# Patient Record
Sex: MALE | Race: AMERICAN INDIAN OR ALASKA NATIVE
[De-identification: names, ages, dates, MRNs, and addresses within clinical notes are randomized per-mention and may not be internally consistent; named-entity substitution may affect disease eponyms.]

---

## 2017-05-16 ENCOUNTER — HOSPITAL ENCOUNTER (EMERGENCY)
Dept: HOSPITAL 5 - ED | Age: 42
Discharge: HOME | End: 2017-05-16
Payer: SELF-PAY

## 2017-05-16 VITALS — DIASTOLIC BLOOD PRESSURE: 94 MMHG | SYSTOLIC BLOOD PRESSURE: 151 MMHG

## 2017-05-16 DIAGNOSIS — F17.200: ICD-10-CM

## 2017-05-16 DIAGNOSIS — N39.0: Primary | ICD-10-CM

## 2017-05-16 LAB
BILIRUB UR QL STRIP: (no result)
BLOOD UR QL VISUAL: (no result)
KETONES UR STRIP-MCNC: (no result) MG/DL
LEUKOCYTE ESTERASE UR QL STRIP: (no result)
MUCOUS THREADS #/AREA URNS HPF: (no result) /HPF
NITRITE UR QL STRIP: (no result)
PH UR STRIP: 6 [PH] (ref 5–7)
RBC #/AREA URNS HPF: 21 /HPF (ref 0–6)
UROBILINOGEN UR-MCNC: < 2 MG/DL (ref ?–2)
WBC #/AREA URNS HPF: > 182 /HPF (ref 0–6)

## 2017-05-16 PROCEDURE — 81001 URINALYSIS AUTO W/SCOPE: CPT

## 2017-05-16 PROCEDURE — 87591 N.GONORRHOEAE DNA AMP PROB: CPT

## 2017-05-16 PROCEDURE — 96372 THER/PROPH/DIAG INJ SC/IM: CPT

## 2017-05-16 PROCEDURE — 99283 EMERGENCY DEPT VISIT LOW MDM: CPT
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
## 2017-08-03 ENCOUNTER — HOSPITAL ENCOUNTER (EMERGENCY)
Dept: HOSPITAL 5 - ED | Age: 42
Discharge: HOME | End: 2017-08-03
Payer: COMMERCIAL

## 2017-08-03 VITALS — DIASTOLIC BLOOD PRESSURE: 92 MMHG | SYSTOLIC BLOOD PRESSURE: 144 MMHG

## 2017-08-03 DIAGNOSIS — S00.81XA: Primary | ICD-10-CM

## 2017-08-03 DIAGNOSIS — Z88.8: ICD-10-CM
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
## 2017-08-03 PROCEDURE — 99282 EMERGENCY DEPT VISIT SF MDM: CPT

## 2017-08-03 PROCEDURE — 90715 TDAP VACCINE 7 YRS/> IM: CPT

## 2017-08-03 PROCEDURE — 90471 IMMUNIZATION ADMIN: CPT
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
## 2018-07-11 ENCOUNTER — HOSPITAL ENCOUNTER (EMERGENCY)
Dept: HOSPITAL 5 - ED | Age: 43
LOS: 1 days | Discharge: HOME | End: 2018-07-12
Payer: COMMERCIAL

## 2018-07-11 VITALS — DIASTOLIC BLOOD PRESSURE: 77 MMHG | SYSTOLIC BLOOD PRESSURE: 139 MMHG

## 2018-07-11 DIAGNOSIS — Z88.8: ICD-10-CM

## 2018-07-11 DIAGNOSIS — M54.5: Primary | ICD-10-CM

## 2018-07-11 DIAGNOSIS — N30.00: ICD-10-CM

## 2018-07-11 PROCEDURE — 96372 THER/PROPH/DIAG INJ SC/IM: CPT

## 2018-07-11 PROCEDURE — 99284 EMERGENCY DEPT VISIT MOD MDM: CPT

## 2018-07-11 PROCEDURE — 81001 URINALYSIS AUTO W/SCOPE: CPT

## 2018-07-11 PROCEDURE — 72100 X-RAY EXAM L-S SPINE 2/3 VWS: CPT

## 2018-07-12 LAB
BILIRUB UR QL STRIP: (no result)
BLOOD UR QL VISUAL: (no result)
HYALINE CASTS #/AREA URNS LPF: 16 /LPF
MUCOUS THREADS #/AREA URNS HPF: (no result) /HPF
PH UR STRIP: 5 [PH] (ref 5–7)
RBC #/AREA URNS HPF: 3 /HPF (ref 0–6)
UROBILINOGEN UR-MCNC: 2 MG/DL (ref ?–2)
WBC #/AREA URNS HPF: 15 /HPF (ref 0–6)

## 2018-07-12 NOTE — XRAY REPORT
FINAL REPORT



EXAM:  XR SPINE LUMBOSACRAL 2-3V



HISTORY:  back pain that is not responding to meds 



COMPARISON:  None available. 



FINDINGS:  

Three views of the lumbar spine obtained. Lumbar vertebral body

heights are preserved. Mild loss of disc height endplate

osteophyte L5-S1 level. Mild loss of disc height endplate

osteophyte T12-L1 level. Remaining disc heights are preserved.

Pedicles are intact. No spondylolisthesis. 



IMPRESSION:  

Mild focal degenerative changes at the T12-L1 and L5-S1 levels.

## 2018-07-12 NOTE — EMERGENCY DEPARTMENT REPORT
ED Back Pain/Injury HPI





- General


Chief Complaint: Back Pain/Injury


Stated Complaint: BACK PAIN


Time Seen by Provider: 07/12/18 00:27


Source: patient


Limitations: No Limitations





- History of Present Illness


Initial Comments: 





42-year-old -American male comes in complaining of right sided lower 

back pain onset yesterday.  Patient denies any trauma or falls.  He does report 

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  He reports that he made 

an attempt to go to work yesterday and started having lower back pain to the 

right he took an Aleve to take Tylenol and ibuprofen with no relief.  Patient 

was given Toradol and triage and reports no relief of his pain.  Patient denies 

any urinary or bowel incontinence or issues.


MD Complaint: back pain


-: days(s) (2)


Similar Symptoms Previously: No


Place: work


Radiation: none


Severity scale (0 -10): 8


Quality: sharp


Consistency: constant


Improves With: none


Worsens With: movement


Associated Symptoms: denies other symptoms


Treatments Prior to Arrival: NSAIDS, acetaminophen





- Related Data


 Previous Rx's











 Medication  Instructions  Recorded  Last Taken  Type


 


Ciprofloxacin HCl [Ciprofloxacin 500 mg PO Q12HR #20 tab 05/16/17 Unknown Rx





TAB]    


 


Amoxicillin/K Clav Tab [Augmentin 1 tab PO Q12HR 10 Days  tab 08/03/17 Unknown 

Rx





875 mg]    


 


Cyclobenzaprine [Flexeril] 10 mg PO TID PRN #15 tablet 07/12/18 Unknown Rx


 


Ibuprofen [Motrin 800 MG tab] 800 mg PO Q8HR PRN #30 tablet 07/12/18 Unknown Rx


 


Nitrofurantoin Monohyd/M-Cryst 100 mg PO BID 7 Days #14 capsule 07/12/18 

Unknown Rx





[Macrobid 100 mg Capsule]    











 Allergies











Allergy/AdvReac Type Severity Reaction Status Date / Time


 


promethazine HCl Allergy  Swelling Verified 05/16/17 16:06





[From Phenergan]     














ED Review of Systems


ROS: 


Stated complaint: BACK PAIN


Other details as noted in HPI





Gastrointestinal: denies: abdominal pain, nausea, diarrhea


Genitourinary: denies: urgency, dysuria, frequency, hematuria, discharge


Musculoskeletal: back pain


Skin: denies: rash, lesions





ED Past Medical Hx





- Past Medical History


Previous Medical History?: Yes


Additional medical history: Bronchitis





- Surgical History


Past Surgical History?: No





- Social History


Smoking Status: Light Tobacco Smoker


Substance Use Type: Alcohol





- Medications


Home Medications: 


 Home Medications











 Medication  Instructions  Recorded  Confirmed  Last Taken  Type


 


Ciprofloxacin HCl [Ciprofloxacin 500 mg PO Q12HR #20 tab 05/16/17  Unknown Rx





TAB]     


 


Amoxicillin/K Clav Tab [Augmentin 1 tab PO Q12HR 10 Days  tab 08/03/17  Unknown 

Rx





875 mg]     


 


Cyclobenzaprine [Flexeril] 10 mg PO TID PRN #15 tablet 07/12/18  Unknown Rx


 


Ibuprofen [Motrin 800 MG tab] 800 mg PO Q8HR PRN #30 tablet 07/12/18  Unknown Rx


 


Nitrofurantoin Monohyd/M-Cryst 100 mg PO BID 7 Days #14 capsule 07/12/18  

Unknown Rx





[Macrobid 100 mg Capsule]     














ED Physical Exam





- General


Limitations: No Limitations


General appearance: alert, in no apparent distress





- Head


Head exam: Present: atraumatic, normocephalic





- ENT


ENT exam: Present: mucous membranes moist





- Respiratory


Respiratory exam: Present: normal lung sounds bilaterally.  Absent: respiratory 

distress





- Cardiovascular


Cardiovascular Exam: Present: regular rate, normal rhythm.  Absent: systolic 

murmur, diastolic murmur, rubs, gallop





- Back Exam


Back exam: Present: CVA tenderness (R)





- Expanded Back Exam


  ** Expanded


Back exam: Positive Straight Leg Raise: Right (cross legged positive)





- Neurological Exam


Neurological exam: Present: alert, oriented X3





ED Course


 Vital Signs











  07/11/18 07/11/18 07/11/18





  19:24 19:40 19:55


 


Temperature 98.4 F 98.4 F 


 


Pulse Rate 79 78 


 


Respiratory 16 16 18





Rate   


 


Blood Pressure 139/77 139/77 


 


O2 Sat by Pulse 94 96 





Oximetry   














ED Medical Decision Making





- Radiology Data


Radiology results: report reviewed, image reviewed





FINAL REPORT 





 EXAM: XR SPINE LUMBOSACRAL 2-3V 





 HISTORY: back pain that is not responding to meds 





 COMPARISON: None available. 





 FINDINGS: 


 Three views of the lumbar spine obtained. Lumbar vertebral body 


 heights are preserved. Mild loss of disc height endplate 


 osteophyte L5-S1 level. Mild loss of disc height endplate 


 osteophyte T12-L1 level. Remaining disc heights are preserved. 


 Pedicles are intact. No spondylolisthesis. 





 IMPRESSION: 


 Mild focal degenerative changes at the T12-L1 and L5-S1 levels. 











 Transcribed By: LMA 


 Dictated By: MELISSA ESQUIVEL MD 


 Electronically Authenticated By: MELISSA ESQUIVEL MD 


 Signed Date/Time: 07/12/18 0047 











 DD/DT: 07/12/18 0047 


 TD/TT: 07/12/18 0047 





- Medical Decision Making





Patient has been evaluated but this provider fast track.


Patient is given Toradol 30 mg IM in triage with no relief.


Flexeril 10 mg Percocet 5/325 2 tablets ordered for pain management.


X-ray of back and urinalysis sent.





Critical care attestation.: 


If time is entered above; I have spent that time in minutes in the direct care 

of this critically ill patient, excluding procedure time.








ED Disposition


Clinical Impression: 


Back pain


Qualifiers:


 Back pain location: low back pain Chronicity: acute Back pain laterality: 

right Sciatica presence: without sciatica Qualified Code(s): M54.5 - Low back 

pain





UTI (urinary tract infection)


Qualifiers:


 Urinary tract infection type: acute cystitis Hematuria presence: without 

hematuria Qualified Code(s): N30.00 - Acute cystitis without hematuria





Disposition: DC-01 TO HOME OR SELFCARE


Is pt being admited?: No


Does the pt Need Aspirin: No


Condition: Stable


Instructions:  Low Back Strain (ED), Back Pain (ED)


Additional Instructions: 


Please take pain medication as prescribed.  Please follow-up with the primary 

care provider if her symptoms persist or gets worse.


Prescriptions: 


Cyclobenzaprine [Flexeril] 10 mg PO TID PRN #15 tablet


 PRN Reason: Muscle Spasm


Ibuprofen [Motrin 800 MG tab] 800 mg PO Q8HR PRN #30 tablet


 PRN Reason: Pain , Severe (7-10)


Nitrofurantoin Monohyd/M-Cryst [Macrobid 100 mg Capsule] 100 mg PO BID 7 Days #

14 capsule


Referrals: 


PRIMARY CARE,MD [Primary Care Provider] - 3-5 Days


[facility] [Provider Group] - 3-5 Days


Forms:  Work/School Release Form(ED), Accompanied Note

## 2020-11-27 ENCOUNTER — HOSPITAL ENCOUNTER (INPATIENT)
Dept: HOSPITAL 5 - ED | Age: 45
LOS: 2 days | Discharge: HOME | DRG: 638 | End: 2020-11-29
Attending: INTERNAL MEDICINE | Admitting: INTERNAL MEDICINE
Payer: SELF-PAY

## 2020-11-27 DIAGNOSIS — E86.0: ICD-10-CM

## 2020-11-27 DIAGNOSIS — E10.10: Primary | ICD-10-CM

## 2020-11-27 DIAGNOSIS — Z83.3: ICD-10-CM

## 2020-11-27 DIAGNOSIS — E87.1: ICD-10-CM

## 2020-11-27 DIAGNOSIS — N17.9: ICD-10-CM

## 2020-11-27 DIAGNOSIS — E87.5: ICD-10-CM

## 2020-11-27 DIAGNOSIS — Z79.4: ICD-10-CM

## 2020-11-27 DIAGNOSIS — Z88.8: ICD-10-CM

## 2020-11-27 PROCEDURE — 96372 THER/PROPH/DIAG INJ SC/IM: CPT

## 2020-11-27 PROCEDURE — 80048 BASIC METABOLIC PNL TOTAL CA: CPT

## 2020-11-27 PROCEDURE — 83036 HEMOGLOBIN GLYCOSYLATED A1C: CPT

## 2020-11-27 PROCEDURE — 82962 GLUCOSE BLOOD TEST: CPT

## 2020-11-27 PROCEDURE — 96374 THER/PROPH/DIAG INJ IV PUSH: CPT

## 2020-11-27 PROCEDURE — 96375 TX/PRO/DX INJ NEW DRUG ADDON: CPT

## 2020-11-27 PROCEDURE — 82947 ASSAY GLUCOSE BLOOD QUANT: CPT

## 2020-11-27 PROCEDURE — 83735 ASSAY OF MAGNESIUM: CPT

## 2020-11-27 PROCEDURE — 93005 ELECTROCARDIOGRAM TRACING: CPT

## 2020-11-27 PROCEDURE — 84100 ASSAY OF PHOSPHORUS: CPT

## 2020-11-27 PROCEDURE — 84443 ASSAY THYROID STIM HORMONE: CPT

## 2020-11-27 PROCEDURE — 80061 LIPID PANEL: CPT

## 2020-11-27 PROCEDURE — 81001 URINALYSIS AUTO W/SCOPE: CPT

## 2020-11-27 PROCEDURE — 82805 BLOOD GASES W/O2 SATURATION: CPT

## 2020-11-27 PROCEDURE — 36415 COLL VENOUS BLD VENIPUNCTURE: CPT

## 2020-11-27 PROCEDURE — 85025 COMPLETE CBC W/AUTO DIFF WBC: CPT

## 2020-11-27 PROCEDURE — 82550 ASSAY OF CK (CPK): CPT

## 2020-11-28 LAB
BASOPHILS # (AUTO): 0 K/MM3 (ref 0–0.1)
BASOPHILS NFR BLD AUTO: 0 % (ref 0–1.8)
BILIRUB UR QL STRIP: (no result)
BLOOD UR QL VISUAL: (no result)
BUN SERPL-MCNC: 19 MG/DL (ref 9–20)
BUN SERPL-MCNC: 23 MG/DL (ref 9–20)
BUN SERPL-MCNC: 25 MG/DL (ref 9–20)
BUN SERPL-MCNC: 26 MG/DL (ref 9–20)
BUN SERPL-MCNC: 28 MG/DL (ref 9–20)
BUN SERPL-MCNC: 31 MG/DL (ref 9–20)
BUN SERPL-MCNC: 32 MG/DL (ref 9–20)
BUN/CREAT SERPL: 15 %
BUN/CREAT SERPL: 16 %
BUN/CREAT SERPL: 16 %
BUN/CREAT SERPL: 17 %
BUN/CREAT SERPL: 18 %
BUN/CREAT SERPL: 18 %
BUN/CREAT SERPL: 19 %
CALCIUM SERPL-MCNC: 10.6 MG/DL (ref 8.4–10.2)
CALCIUM SERPL-MCNC: 10.8 MG/DL (ref 8.4–10.2)
CALCIUM SERPL-MCNC: 11.1 MG/DL (ref 8.4–10.2)
CALCIUM SERPL-MCNC: 11.2 MG/DL (ref 8.4–10.2)
CALCIUM SERPL-MCNC: 11.2 MG/DL (ref 8.4–10.2)
CALCIUM SERPL-MCNC: 11.4 MG/DL (ref 8.4–10.2)
CALCIUM SERPL-MCNC: 9.9 MG/DL (ref 8.4–10.2)
EOSINOPHIL # BLD AUTO: 0 K/MM3 (ref 0–0.4)
EOSINOPHIL NFR BLD AUTO: 0 % (ref 0–4.3)
HCT VFR BLD CALC: 45.1 % (ref 35.5–45.6)
HDLC SERPL-MCNC: 53 MG/DL (ref 40–59)
HEMOLYSIS INDEX: 11
HEMOLYSIS INDEX: 14
HEMOLYSIS INDEX: 18
HEMOLYSIS INDEX: 26
HEMOLYSIS INDEX: 5
HEMOLYSIS INDEX: 59
HEMOLYSIS INDEX: 9
HGB BLD-MCNC: 15.7 GM/DL (ref 11.8–15.2)
LYMPHOCYTES # BLD AUTO: 0.8 K/MM3 (ref 1.2–5.4)
LYMPHOCYTES NFR BLD AUTO: 7 % (ref 13.4–35)
MCHC RBC AUTO-ENTMCNC: 29 % (ref 32–34)
MCV RBC AUTO: 108 FL (ref 84–94)
MONOCYTES # (AUTO): 0.6 K/MM3 (ref 0–0.8)
MONOCYTES % (AUTO): 4.9 % (ref 0–7.3)
MUCOUS THREADS #/AREA URNS HPF: (no result) /HPF
PH UR STRIP: 5 [PH] (ref 5–7)
PLATELET # BLD: 195 K/MM3 (ref 140–440)
PROT UR STRIP-MCNC: (no result) MG/DL
RBC # BLD AUTO: 5.02 M/MM3 (ref 3.65–5.03)
RBC #/AREA URNS HPF: 1 /HPF (ref 0–6)
UROBILINOGEN UR-MCNC: < 2 MG/DL (ref ?–2)
WBC #/AREA URNS HPF: 4 /HPF (ref 0–6)

## 2020-11-28 RX ADMIN — Medication SCH: at 22:51

## 2020-11-28 RX ADMIN — HEPARIN SODIUM SCH UNIT: 5000 INJECTION, SOLUTION INTRAVENOUS; SUBCUTANEOUS at 22:50

## 2020-11-28 RX ADMIN — VALSARTAN SCH MG: 40 TABLET, FILM COATED ORAL at 19:44

## 2020-11-28 RX ADMIN — HEPARIN SODIUM SCH UNIT: 5000 INJECTION, SOLUTION INTRAVENOUS; SUBCUTANEOUS at 06:50

## 2020-11-28 RX ADMIN — INSULIN HUMAN SCH UNIT: 100 INJECTION, SUSPENSION SUBCUTANEOUS at 17:38

## 2020-11-28 RX ADMIN — INSULIN LISPRO SCH: 100 INJECTION, SOLUTION INTRAVENOUS; SUBCUTANEOUS at 22:26

## 2020-11-28 RX ADMIN — Medication SCH ML: at 11:13

## 2020-11-28 RX ADMIN — HEPARIN SODIUM SCH UNIT: 5000 INJECTION, SOLUTION INTRAVENOUS; SUBCUTANEOUS at 16:26

## 2020-11-28 RX ADMIN — VALSARTAN SCH: 40 TABLET, FILM COATED ORAL at 22:26

## 2020-11-28 NOTE — PROGRESS NOTE
Assessment and Plan





- Patient Problems


(1) DKA (diabetic ketoacidoses)


Current Visit: Yes   Status: Acute   


Qualifiers: 


   Diabetes mellitus type: type 1 


Plan to address problem: 


Much  improved


Discontinue IV  Insulin


IV fluids


Started on Novolin 70/30 25 units bid


Educated on Diabetes Mellitus 


Diet exercise  advised


 








(2) Dehydration


Current Visit: Yes   Status: Acute   


Plan to address problem: 


IV fluids for now








(3) PAULINA (acute kidney injury)


Current Visit: Yes   Status: Acute   


Plan to address problem: 


IV fluids for now


Improved








(4) Hyponatremia


Current Visit: Yes   Status: Acute   


Plan to address problem: 


Improved








(5) Hyperkalemia


Current Visit: Yes   Status: Acute   


Plan to address problem: 


Improved








(6) DVT prophylaxis


Current Visit: Yes   Status: Acute   


Plan to address problem: 


Heparin and GI prophylaxis








Subjective


Date of service: 11/28/20


Principal diagnosis: DKA


Interval history: 


45-year-old -American male with no significant past medical problems 

presenting to the emergency room today with complaints of been dehydrated.  He 

states he has been having dry mouth, polyuria, polydipsia, nausea and vomiting 

and generalized malaise.  He also indicates that he has been having 

unintentional weight loss.  He denies any fever or chills, no chest pain or 

shortness of breath, no headache or dizziness.


Patient works as a .  Denies any sick contacts and no recent 

travel.  Denies any contact with anyone with COVID-19.


Upon arrival in the emergency room today blood sugar was found to be over 1000. 

He was found to be in DKA.


He admits that both of his parents diabetic.


Patient has been started on insulin drip and and IV fluid.





Day #2 11/28/2020


Patient symptomatically better


Some blurring of vision


Less nausea, no vomiting


Started on clear liquids











Objective





- Constitutional


Vitals: 


                               Vital Signs - 12hr











  11/28/20 11/28/20 11/28/20





  04:00 04:30 05:00


 


Pulse Rate  82 


 


Blood Pressure 131/81 122/68 116/68


 


O2 Sat by Pulse 93 92 92





Oximetry   














  11/28/20 11/28/20 11/28/20





  05:30 06:00 06:30


 


Pulse Rate 76  


 


Blood Pressure 122/87 111/73 136/78


 


O2 Sat by Pulse  88 93





Oximetry   














  11/28/20





  12:04


 


Pulse Rate 105 H


 


Blood Pressure 178/111


 


O2 Sat by Pulse 





Oximetry 











General appearance: Present: no acute distress, well-nourished





- EENT


Eyes: PERRL, EOM intact


ENT: hearing intact, clear oral mucosa


Ears: bilateral: normal





- Neck


Neck: supple, normal ROM





- Respiratory


Respiratory effort: normal


Respiratory: bilateral: CTA





- Breasts


Breasts: normal





- Cardiovascular


Heart rate: 78


Rhythm: regular


Heart Sounds: Present: S1 & S2.  Absent: gallop, rub


Extremities: pulses intact, No edema, normal color, Full ROM





- Gastrointestinal


General gastrointestinal: Present: soft, non-tender, non-distended, normal bowel

sounds





- Genitourinary


Male genitourinary: normal





- Integumentary


Integumentary: clear, warm, dry





- Musculoskeletal


Musculoskeletal: 1, strength equal bilaterally





- Neurologic


Neurologic: moves all extremities





- Psychiatric


Psychiatric: memory intact, appropriate mood/affect, intact judgment & insight





- Labs


CBC & Chem 7: 


                                 11/27/20 23:50





                                 11/29/20 01:50


Labs: 


                              Abnormal lab results











  11/27/20 11/27/20 11/28/20 Range/Units





  23:50 23:50 01:59 


 


WBC  11.5 H    (4.5-11.0)  K/mm3


 


Hgb  15.7 H    (11.8-15.2)  gm/dl


 


MCV  108 H    (84-94)  fl


 


MCHC  29 L    (32-34)  %


 


Lymph % (Auto)  7.0 L    (13.4-35.0)  %


 


Lymph # (Auto)  0.8 L    (1.2-5.4)  K/mm3


 


Seg Neutrophils %  88.1 H    (40.0-70.0)  %


 


Seg Neutrophils #  10.1 H    (1.8-7.7)  K/mm3


 


VBG pH     (7.320-7.420)  


 


Sodium   123 L   (137-145)  mmol/L


 


Potassium   5.2 H   (3.6-5.0)  mmol/L


 


Chloride   79.0 L   ()  mmol/L


 


BUN   32 H   (9-20)  mg/dL


 


Creatinine   1.8 H   (0.8-1.3)  mg/dL


 


Glucose   1624 H*   ()  mg/dL


 


POC Glucose     ()  mg/dL


 


Hemoglobin A1c    > 17.0 H  (4-6)  %


 


Calcium   11.2 H   (8.4-10.2)  mg/dL


 


Phosphorus     (2.5-4.5)  mg/dL


 


Magnesium     (1.7-2.3)  mg/dL


 


Total Creatine Kinase     ()  units/L


 


Triglycerides     (2-149)  mg/dL














  11/28/20 11/28/20 11/28/20 Range/Units





  01:59 01:59 01:59 


 


WBC     (4.5-11.0)  K/mm3


 


Hgb     (11.8-15.2)  gm/dl


 


MCV     (84-94)  fl


 


MCHC     (32-34)  %


 


Lymph % (Auto)     (13.4-35.0)  %


 


Lymph # (Auto)     (1.2-5.4)  K/mm3


 


Seg Neutrophils %     (40.0-70.0)  %


 


Seg Neutrophils #     (1.8-7.7)  K/mm3


 


VBG pH   7.316 L   (7.320-7.420)  


 


Sodium    124 L  (137-145)  mmol/L


 


Potassium    5.3 H  (3.6-5.0)  mmol/L


 


Chloride    82.5 L  ()  mmol/L


 


BUN    31 H  (9-20)  mg/dL


 


Creatinine    1.7 H  (0.8-1.3)  mg/dL


 


Glucose    1405 H*  ()  mg/dL


 


POC Glucose     ()  mg/dL


 


Hemoglobin A1c     (4-6)  %


 


Calcium    10.8 H  (8.4-10.2)  mg/dL


 


Phosphorus    7.00 H  (2.5-4.5)  mg/dL


 


Magnesium  2.50 H   2.60 H  (1.7-2.3)  mg/dL


 


Total Creatine Kinase  574 H    ()  units/L


 


Triglycerides     (2-149)  mg/dL














  11/28/20 11/28/20 11/28/20 Range/Units





  03:48 03:48 05:58 


 


WBC     (4.5-11.0)  K/mm3


 


Hgb     (11.8-15.2)  gm/dl


 


MCV     (84-94)  fl


 


MCHC     (32-34)  %


 


Lymph % (Auto)     (13.4-35.0)  %


 


Lymph # (Auto)     (1.2-5.4)  K/mm3


 


Seg Neutrophils %     (40.0-70.0)  %


 


Seg Neutrophils #     (1.8-7.7)  K/mm3


 


VBG pH     (7.320-7.420)  


 


Sodium  133 L D    (137-145)  mmol/L


 


Potassium     (3.6-5.0)  mmol/L


 


Chloride  95.6 L    ()  mmol/L


 


BUN  28 H   26 H  (9-20)  mg/dL


 


Creatinine  1.5 H   1.6 H  (0.8-1.3)  mg/dL


 


Glucose  913 H*   647 H*  ()  mg/dL


 


POC Glucose     ()  mg/dL


 


Hemoglobin A1c     (4-6)  %


 


Calcium  10.6 H   11.4 H  (8.4-10.2)  mg/dL


 


Phosphorus     (2.5-4.5)  mg/dL


 


Magnesium   2.80 H   (1.7-2.3)  mg/dL


 


Total Creatine Kinase     ()  units/L


 


Triglycerides   157 H   (2-149)  mg/dL














  11/28/20 11/28/20 11/28/20 Range/Units





  07:57 10:28 11:06 


 


WBC     (4.5-11.0)  K/mm3


 


Hgb     (11.8-15.2)  gm/dl


 


MCV     (84-94)  fl


 


MCHC     (32-34)  %


 


Lymph % (Auto)     (13.4-35.0)  %


 


Lymph # (Auto)     (1.2-5.4)  K/mm3


 


Seg Neutrophils %     (40.0-70.0)  %


 


Seg Neutrophils #     (1.8-7.7)  K/mm3


 


VBG pH     (7.320-7.420)  


 


Sodium  146 H  148 H   (137-145)  mmol/L


 


Potassium     (3.6-5.0)  mmol/L


 


Chloride   108.3 H   ()  mmol/L


 


BUN  25 H  23 H   (9-20)  mg/dL


 


Creatinine  1.5 H  1.4 H   (0.8-1.3)  mg/dL


 


Glucose  429 H  252 H   ()  mg/dL


 


POC Glucose    247 H  ()  mg/dL


 


Hemoglobin A1c     (4-6)  %


 


Calcium  11.2 H  11.1 H   (8.4-10.2)  mg/dL


 


Phosphorus     (2.5-4.5)  mg/dL


 


Magnesium     (1.7-2.3)  mg/dL


 


Total Creatine Kinase     ()  units/L


 


Triglycerides     (2-149)  mg/dL














  11/28/20 11/28/20 11/28/20 Range/Units





  12:11 13:10 14:54 


 


WBC     (4.5-11.0)  K/mm3


 


Hgb     (11.8-15.2)  gm/dl


 


MCV     (84-94)  fl


 


MCHC     (32-34)  %


 


Lymph % (Auto)     (13.4-35.0)  %


 


Lymph # (Auto)     (1.2-5.4)  K/mm3


 


Seg Neutrophils %     (40.0-70.0)  %


 


Seg Neutrophils #     (1.8-7.7)  K/mm3


 


VBG pH     (7.320-7.420)  


 


Sodium     (137-145)  mmol/L


 


Potassium     (3.6-5.0)  mmol/L


 


Chloride     ()  mmol/L


 


BUN     (9-20)  mg/dL


 


Creatinine     (0.8-1.3)  mg/dL


 


Glucose     ()  mg/dL


 


POC Glucose  153 H  224 H  272 H  ()  mg/dL


 


Hemoglobin A1c     (4-6)  %


 


Calcium     (8.4-10.2)  mg/dL


 


Phosphorus     (2.5-4.5)  mg/dL


 


Magnesium     (1.7-2.3)  mg/dL


 


Total Creatine Kinase     ()  units/L


 


Triglycerides     (2-149)  mg/dL

## 2020-11-28 NOTE — HISTORY AND PHYSICAL REPORT
History of Present Illness


Date of examination: 11/28/20


Date of admission: 


11/28/20 01:51





Chief complaint: 





Malaise


Fatigue


History of present illness: 





45-year-old -American male with no significant past medical problems 

presenting to the emergency room today with complaints of been dehydrated.  He 

states he has been having dry mouth, polyuria, polydipsia, nausea and vomiting 

and generalized malaise.  He also indicates that he has been having unintentiona

l weight loss.  He denies any fever or chills, no chest pain or shortness of 

breath, no headache or dizziness.





Patient works as a .  Denies any sick contacts and no recent 

travel.  Denies any contact with anyone with COVID-19.





Upon arrival in the emergency room today blood sugar was found to be over 1000. 

He was found to be in DKA.


He admits that both of his parents diabetic.


Patient has been started on insulin drip and and IV fluid.





Past History


Past Medical History: No medical history


Past Surgical History: No surgical history


Social history: no significant social history


Family history: diabetes (In both Parents)





Medications and Allergies


                                    Allergies











Allergy/AdvReac Type Severity Reaction Status Date / Time


 


promethazine HCl Allergy  Swelling Verified 05/16/17 16:06





[From Phenergan]     











                                Home Medications











 Medication  Instructions  Recorded  Confirmed  Last Taken  Type


 


Ciprofloxacin HCl [Ciprofloxacin 500 mg PO Q12HR #20 tab 05/16/17  Unknown Rx





TAB]     


 


Amoxicillin/K Clav Tab [Augmentin 1 tab PO Q12HR 10 Days  tab 08/03/17  Unknown 

Rx





875 mg]     


 


Cyclobenzaprine [Flexeril] 10 mg PO TID PRN #15 tablet 07/12/18  Unknown Rx


 


Ibuprofen [Motrin 800 MG tab] 800 mg PO Q8HR PRN #30 tablet 07/12/18  Unknown Rx


 


Nitrofurantoin Monohyd/M-Cryst 100 mg PO BID 7 Days #14 capsule 07/12/18  

Unknown Rx





[Macrobid 100 mg Capsule]     


 


Amlodipine Besylate [Norvasc] 5 mg PO DAILY #30 tablet 03/19/20  Unknown Rx


 


Fluticasone [Flonase] 1 spray NS QDAY #1 bottle 03/19/20  Unknown Rx











Active Meds: 


Active Medications





Dextrose (D50w (25gm) Syringe)  0 ml IV Q30MIN PRN; Protocol


   PRN Reason: Hypoglycemia


Heparin Sodium (Porcine) (Heparin)  5,000 unit SUB-Q Q8HR [facility]


Insulin Human Regular 100 (units/ Sodium Chloride)  100 mls @ 1 mls/hr IV TITR 

[facility]; Protocol


   Last Admin: 11/28/20 02:31 Dose:  1 units/hr, 1 mls/hr


   Documented by: 


Potassium Chloride/Dextrose/Sod Cl (D5w/0.45% Nacl/Kcl 20 Meq)  20 meq in 1,000 

mls @ 125 mls/hr IV AS DIRECT ARNOLDO


Sodium Chloride (Nacl 0.9% 1000 Ml)  1,000 mls @ 150 mls/hr IV AS DIRECT ARNOLDO


Morphine Sulfate (Morphine)  2 mg IV Q4H PRN


   PRN Reason: Pain, Moderate (4-6)


Ondansetron HCl (Zofran)  4 mg IV Q8H PRN


   PRN Reason: Nausea And Vomiting


Sodium Chloride (Sodium Chloride Flush Syringe 10 Ml)  10 ml IV BID ARNOLDO


Sodium Chloride (Sodium Chloride Flush Syringe 10 Ml)  10 ml IV PRN PRN


   PRN Reason: LINE FLUSH











Review of Systems


Constitutional: malaise, lethargy, no fever, no chills


Ears, nose, mouth and throat: no nasal congestion, no sore throat


Cardiovascular: no chest pain, no palpitations


Respiratory: no cough, no shortness of breath, no wheezing


Gastrointestinal: nausea, vomiting, no abdominal pain, no diarrhea


Genitourinary Male: urinary frequency, no dysuria, no hematuria, no nocturia


Musculoskeletal: no neck pain, no low back pain


Integumentary: no rash, no pruritis


Neurological: no headaches, no confusion


Psychiatric: no anxiety, no depression


Endocrine: polydipsia, polyuria, weight change, fatigue





Exam





- Constitutional


Vitals: 


                                        











Temp Pulse Resp BP Pulse Ox


 


 98.4 F   114 H  18   151/108   95 


 


 11/27/20 23:36  11/27/20 23:36  11/27/20 23:36  11/27/20 23:36  11/27/20 23:36











General appearance: Present: no acute distress, well-nourished, other (Dry Oral 

Mucosa)





- EENT


Eyes: Present: PERRL, EOM intact.  Absent: scleral icterus


ENT: hearing intact, clear oral mucosa, dentition normal





- Neck


Neck: Present: supple, normal ROM





- Respiratory


Respiratory effort: normal


Respiratory: bilateral: CTA





- Cardiovascular


Rhythm: regular


Heart Sounds: Present: S1 & S2.  Absent: gallop, systolic murmur, diastolic 

murmur, rub





- Extremities


Extremities: no ischemia, pulses intact, pulses symmetrical, No edema, Full ROM


Peripheral Pulses: within normal limits





- Abdominal


General gastrointestinal: Present: soft, non-tender, non-distended, normal bowel

 sounds.  Absent: mass





- Integumentary


Integumentary: Present: clear, warm, dry.  Absent: rash





- Musculoskeletal


Musculoskeletal: strength equal bilaterally





- Psychiatric


Psychiatric: appropriate mood/affect, intact judgment & insight, memory intact, 

cooperative





- Neurologic


Neurologic: CNII-XII intact, no focal deficits, moves all extremities





Results





- Labs


CBC & Chem 7: 


                                 11/27/20 23:50





                                 11/28/20 03:48


Labs: 


                              Abnormal lab results











  11/27/20 11/27/20 11/28/20 Range/Units





  23:50 23:50 01:59 


 


WBC  11.5 H    (4.5-11.0)  K/mm3


 


Hgb  15.7 H    (11.8-15.2)  gm/dl


 


MCV  108 H    (84-94)  fl


 


MCHC  29 L    (32-34)  %


 


Lymph % (Auto)  7.0 L    (13.4-35.0)  %


 


Lymph # (Auto)  0.8 L    (1.2-5.4)  K/mm3


 


Seg Neutrophils %  88.1 H    (40.0-70.0)  %


 


Seg Neutrophils #  10.1 H    (1.8-7.7)  K/mm3


 


VBG pH     (7.320-7.420)  


 


Sodium   123 L   (137-145)  mmol/L


 


Potassium   5.2 H   (3.6-5.0)  mmol/L


 


Chloride   79.0 L   ()  mmol/L


 


BUN   32 H   (9-20)  mg/dL


 


Creatinine   1.8 H   (0.8-1.3)  mg/dL


 


Glucose   1624 H*   ()  mg/dL


 


Calcium   11.2 H   (8.4-10.2)  mg/dL


 


Phosphorus     (2.5-4.5)  mg/dL


 


Magnesium    2.50 H  (1.7-2.3)  mg/dL


 


Total Creatine Kinase    574 H  ()  units/L














  11/28/20 11/28/20 Range/Units





  01:59 01:59 


 


WBC    (4.5-11.0)  K/mm3


 


Hgb    (11.8-15.2)  gm/dl


 


MCV    (84-94)  fl


 


MCHC    (32-34)  %


 


Lymph % (Auto)    (13.4-35.0)  %


 


Lymph # (Auto)    (1.2-5.4)  K/mm3


 


Seg Neutrophils %    (40.0-70.0)  %


 


Seg Neutrophils #    (1.8-7.7)  K/mm3


 


VBG pH  7.316 L   (7.320-7.420)  


 


Sodium   124 L  (137-145)  mmol/L


 


Potassium   5.3 H  (3.6-5.0)  mmol/L


 


Chloride   82.5 L  ()  mmol/L


 


BUN   31 H  (9-20)  mg/dL


 


Creatinine   1.7 H  (0.8-1.3)  mg/dL


 


Glucose   1405 H*  ()  mg/dL


 


Calcium   10.8 H  (8.4-10.2)  mg/dL


 


Phosphorus   7.00 H  (2.5-4.5)  mg/dL


 


Magnesium   2.60 H  (1.7-2.3)  mg/dL


 


Total Creatine Kinase    ()  units/L














Assessment and Plan





- Patient Problems


(1) DKA (diabetic ketoacidoses)


Current Visit: Yes   Status: Acute   


Plan to address problem: 


Patient admitted to the intensive care unit.  Started on insulin drip and IV 

fluid.








(2) Renal insufficiency


Current Visit: Yes   Status: Acute   


Plan to address problem: 


We will monitor BUN and creatinine.  We will continue IV fluid hydration.








(3) DVT prophylaxis


Current Visit: Yes   Status: Acute   


Plan to address problem: 


Patient placed on subcutaneous heparin.








(4) Full code status


Current Visit: Yes   Status: Acute

## 2020-11-28 NOTE — EMERGENCY DEPARTMENT REPORT
ED General Adult HPI





- General


Chief complaint: Pain General


Stated complaint: DEHYDRATED


PUI?: No


Time Seen by Provider: 11/28/20 01:22


Source: patient, RN notes reviewed, old records reviewed


Mode of arrival: Ambulatory


Limitations: No Limitations





- History of Present Illness


Initial comments: 





The patient was evaluated in the emergency department for symptoms described in 

the history of present illness.  He/she was evaluated in the context of the 

global COVID-19 pandemic, which necessitated consideration that the patient m

ight be at risk for infection with the virus that causes COVID-19.  

Institutional protocols and algorithms that pertain to the evaluation of 

patients at risk for COVID-19 are in a state of rapid change based on 

information released by regulatory bodies including the CDC and federal and 

state organizations.  These policies and algorithms were followed during the 

patient's care in the emergency department.  Please note that these policies, 

procedures and recommendations changed on a rapid basis.





The patient is a 45-year-old gentleman.  He is not known to myself previously.  

He does not have a primary care doctor he does not have chronic medical 

conditions.  He denies fever, loss of taste, loss of taste smell, and Covid 

exposure.





He presents to the ER today with a complaint of "I feel dehydrated."  He 

endorses dry tongue, unintentional weight loss, polyuria, nausea, malaise and 

fatigue.  He denies physical pain.  He denies dysuria.  Positive nausea.  No 

diarrhea.  No chest pain, no shortness of breath.





Symptoms constant, do not radiate anywhere, do not have exacerbating relieving 

factors that he is aware of.


-: Gradual, days(s)


Consistency: constant


Improves with: none


Worsens with: none





- Related Data


                                  Previous Rx's











 Medication  Instructions  Recorded  Last Taken  Type


 


Ciprofloxacin HCl [Ciprofloxacin 500 mg PO Q12HR #20 tab 05/16/17 Unknown Rx





TAB]    


 


Amoxicillin/K Clav Tab [Augmentin 1 tab PO Q12HR 10 Days  tab 08/03/17 Unknown 

Rx





875 mg]    


 


Cyclobenzaprine [Flexeril] 10 mg PO TID PRN #15 tablet 07/12/18 Unknown Rx


 


Ibuprofen [Motrin 800 MG tab] 800 mg PO Q8HR PRN #30 tablet 07/12/18 Unknown Rx


 


Nitrofurantoin Monohyd/M-Cryst 100 mg PO BID 7 Days #14 capsule 07/12/18 Unknown

Rx





[Macrobid 100 mg Capsule]    


 


Amlodipine Besylate [Norvasc] 5 mg PO DAILY #30 tablet 03/19/20 Unknown Rx


 


Fluticasone [Flonase] 1 spray NS QDAY #1 bottle 03/19/20 Unknown Rx











                                    Allergies











Allergy/AdvReac Type Severity Reaction Status Date / Time


 


promethazine HCl Allergy  Swelling Verified 05/16/17 16:06





[From Phenergan]     














ED Review of Systems


ROS: 


Stated complaint: DEHYDRATED


Other details as noted in HPI





Constitutional: malaise, weakness.  denies: fever


Eyes: denies: eye discharge


ENT: denies: congestion


Respiratory: denies: cough


Cardiovascular: denies: chest pain


Gastrointestinal: abdominal pain, nausea


Genitourinary: denies: dysuria


Musculoskeletal: denies: back pain


Skin: denies: lesions


Neurological: weakness


Psychiatric: anxiety


Hematological/Lymphatic: denies: easy bleeding





ED Past Medical Hx





- Past Medical History


Previous Medical History?: Yes


Additional medical history: Bronchitis





- Surgical History


Past Surgical History?: No





- Social History


Smoking Status: Current Every Day Smoker


Substance Use Type: None





- Medications


Home Medications: 


                                Home Medications











 Medication  Instructions  Recorded  Confirmed  Last Taken  Type


 


Ciprofloxacin HCl [Ciprofloxacin 500 mg PO Q12HR #20 tab 05/16/17  Unknown Rx





TAB]     


 


Amoxicillin/K Clav Tab [Augmentin 1 tab PO Q12HR 10 Days  tab 08/03/17  Unknown 

Rx





875 mg]     


 


Cyclobenzaprine [Flexeril] 10 mg PO TID PRN #15 tablet 07/12/18  Unknown Rx


 


Ibuprofen [Motrin 800 MG tab] 800 mg PO Q8HR PRN #30 tablet 07/12/18  Unknown Rx


 


Nitrofurantoin Monohyd/M-Cryst 100 mg PO BID 7 Days #14 capsule 07/12/18  

Unknown Rx





[Macrobid 100 mg Capsule]     


 


Amlodipine Besylate [Norvasc] 5 mg PO DAILY #30 tablet 03/19/20  Unknown Rx


 


Fluticasone [Flonase] 1 spray NS QDAY #1 bottle 03/19/20  Unknown Rx














ED Physical Exam





- General


Limitations: No Limitations


General appearance: alert, anxious, obese





- Head


Head exam: Present: atraumatic, normocephalic





- Eye


Eye exam: Present: normal appearance, EOMI.  Absent: nystagmus





- ENT


ENT exam: Present: normal exam, normal orophraynx, mucous membranes dry, normal 

external ear exam





- Neck


Neck exam: Present: normal inspection, full ROM.  Absent: tenderness, 

meningismus





- Respiratory


Respiratory exam: Present: normal lung sounds bilaterally.  Absent: respiratory 

distress, wheezes, rales, rhonchi, stridor, decreased breath sounds





- Cardiovascular


Cardiovascular Exam: Present: normal rhythm, tachycardia, normal heart sounds.  

Absent: systolic murmur, diastolic murmur, rubs, gallop





- GI/Abdominal


GI/Abdominal exam: Present: soft.  Absent: distended, tenderness, guarding, 

rebound, rigid, pulsatile mass





- Rectal


Rectal exam: Present: deferred





- Extremities Exam


Extremities exam: Present: normal inspection, full ROM, other (2+ pulses noted 

in the bilateral upper and lower extremities.  There is no palpable cord.   

negative Homans sign.  Muscular compartments are soft.  The pelvis is stable.). 

Absent: pedal edema, calf tenderness





- Back Exam


Back exam: Present: normal inspection, full ROM.  Absent: tenderness, CVA 

tenderness (R), CVA tenderness (L), paraspinal tenderness, vertebral tenderness





- Neurological Exam


Neurological exam: Present: alert, other (No facial droop.  Tongue midline.  

Extraocular movements intact bilaterally.  Facial sensation intact to light 

touch in V1, V2, V3 distribution bilaterally.  5 and a 5 strength in 4 

extremities.  Sensation intact to light touch in 4 extremities.)





- Psychiatric


Psychiatric exam: Present: anxious





- Skin


Skin exam: Present: warm, dry, intact, normal color.  Absent: rash





ED Course


                                   Vital Signs











  11/27/20





  23:36


 


Temperature 98.4 F


 


Pulse Rate 114 H


 


Respiratory 18





Rate 


 


Blood Pressure 151/108


 


O2 Sat by Pulse 95





Oximetry 














ED Medical Decision Making





- Lab Data


Result diagrams: 


                                 11/27/20 23:50





                                 11/28/20 01:59








                                   Vital Signs











  11/27/20





  23:36


 


Temperature 98.4 F


 


Pulse Rate 114 H


 


Respiratory 18





Rate 


 


Blood Pressure 151/108


 


O2 Sat by Pulse 95





Oximetry 











                                   Lab Results











  11/27/20 11/27/20 11/27/20 Range/Units





  23:50 23:50 Unknown 


 


WBC  11.5 H    (4.5-11.0)  K/mm3


 


RBC  5.02    (3.65-5.03)  M/mm3


 


Hgb  15.7 H    (11.8-15.2)  gm/dl


 


Hct  45.1    (35.5-45.6)  %


 


MCV  108 H    (84-94)  fl


 


MCH  31    (28-32)  pg


 


MCHC  29 L    (32-34)  %


 


RDW  14.6    (13.2-15.2)  %


 


Plt Count  195    (140-440)  K/mm3


 


Lymph % (Auto)  7.0 L    (13.4-35.0)  %


 


Mono % (Auto)  4.9    (0.0-7.3)  %


 


Eos % (Auto)  0.0    (0.0-4.3)  %


 


Baso % (Auto)  0.0    (0.0-1.8)  %


 


Lymph # (Auto)  0.8 L    (1.2-5.4)  K/mm3


 


Mono # (Auto)  0.6    (0.0-0.8)  K/mm3


 


Eos # (Auto)  0.0    (0.0-0.4)  K/mm3


 


Baso # (Auto)  0.0    (0.0-0.1)  K/mm3


 


Seg Neutrophils %  88.1 H    (40.0-70.0)  %


 


Seg Neutrophils #  10.1 H    (1.8-7.7)  K/mm3


 


Sodium   123 L   (137-145)  mmol/L


 


Potassium   5.2 H   (3.6-5.0)  mmol/L


 


Chloride   79.0 L   ()  mmol/L


 


Carbon Dioxide   25   (22-30)  mmol/L


 


Anion Gap   24   mmol/L


 


BUN   32 H   (9-20)  mg/dL


 


Creatinine   1.8 H   (0.8-1.3)  mg/dL


 


Estimated GFR   50   ml/min


 


BUN/Creatinine Ratio   18   %


 


Glucose   1624 H*   ()  mg/dL


 


Calcium   11.2 H   (8.4-10.2)  mg/dL


 


Urine Color    Colorless  (Yellow)  


 


Urine Turbidity    Clear  (Clear)  


 


Urine pH    5.0  (5.0-7.0)  


 


Ur Specific Gravity    1.027  (1.003-1.030)  


 


Urine Protein    <15 mg/dl  (Negative)  mg/dL


 


Urine Glucose (UA)    >=500  (Negative)  mg/dL


 


Urine Ketones    Tr  (Negative)  mg/dL


 


Urine Blood    Sm  (Negative)  


 


Urine Nitrite    Neg  (Negative)  


 


Urine Bilirubin    Neg  (Negative)  


 


Urine Urobilinogen    < 2.0  (<2.0)  mg/dL


 


Ur Leukocyte Esterase    Neg  (Negative)  


 


Urine WBC (Auto)    4.0  (0.0-6.0)  /HPF


 


Urine RBC (Auto)    1.0  (0.0-6.0)  /HPF


 


Urine Mucus    Few  /HPF














- EKG Data


-: EKG Interpreted by Me


EKG shows normal: sinus rhythm


Rate: normal





- EKG Data


When compared to previous EKG there are: previous EKG unavailable





11/28/20 01:52


Sinus rhythm, 93 bpm, normal axis, normal intervals, minimal motion artifact, 

borderline high left ventricular voltage, the EKG is not a STEMI, there is no 

EKG available for comparison.





- Medical Decision Making





Differential diagnosis, including but not limited to: Diabetic ketoacidosis, h

yperosmolar state, renal insufficiency, dehydration, electrolyte derangement





Assessment and plan: 45-year-old gentleman with dehydration, dry mucous 

membranes, polyuria, polydipsia, unintentional weight loss, glucose of 1600, 

anion gap, renal insufficiency.





Meets criteria for hospitalization secondary to aforementioned metabolic 

derangement.  He is afebrile, with reassuring vital signs with the exception of 

tachycardia, speaking in full sentences, protecting his airway, with a nonfocal 

motor examination.





Do not clinically suspect Covid pneumonia at this time.





We have recommended admission to the medical service for initiation of IV fluids

and insulin drip.





The patient is amenable to this plan of care.





Hospital physician, Dr. MERCED Tellez to admit





Given normal mentation, I suspect that patient is a chronic undiagnosed 

diabetic, it sounds like the exacerbating/inciting factor here are recent 

dietary indiscretions.








Hyponatremia is likely pseudohyponatremia, likely secondary to marked 

hyperglycemia.


Critical Care Time: Yes


Critical care time in (mins) excluding proc time.: 35


Critical care attestation.: 


If time is entered above; I have spent that time in minutes in the direct care 

of this critically ill patient, excluding procedure time.








ED Disposition


Clinical Impression: 


 DKA (diabetic ketoacidoses), Renal insufficiency, Dehydration





Disposition: DC-09 OP ADMIT IP TO THIS HOSP


Is pt being admited?: Yes


Does the pt Need Aspirin: No


Condition: Critical

## 2020-11-29 VITALS — DIASTOLIC BLOOD PRESSURE: 78 MMHG | SYSTOLIC BLOOD PRESSURE: 131 MMHG

## 2020-11-29 LAB
BUN SERPL-MCNC: 16 MG/DL (ref 9–20)
BUN/CREAT SERPL: 13 %
CALCIUM SERPL-MCNC: 9.4 MG/DL (ref 8.4–10.2)
HEMOLYSIS INDEX: 10

## 2020-11-29 RX ADMIN — HEPARIN SODIUM SCH: 5000 INJECTION, SOLUTION INTRAVENOUS; SUBCUTANEOUS at 06:34

## 2020-11-29 RX ADMIN — Medication SCH ML: at 09:03

## 2020-11-29 RX ADMIN — INSULIN LISPRO SCH UNIT: 100 INJECTION, SOLUTION INTRAVENOUS; SUBCUTANEOUS at 12:32

## 2020-11-29 RX ADMIN — INSULIN LISPRO SCH UNIT: 100 INJECTION, SOLUTION INTRAVENOUS; SUBCUTANEOUS at 08:41

## 2020-11-29 RX ADMIN — HEPARIN SODIUM SCH: 5000 INJECTION, SOLUTION INTRAVENOUS; SUBCUTANEOUS at 13:34

## 2020-11-29 RX ADMIN — VALSARTAN SCH MG: 40 TABLET, FILM COATED ORAL at 09:02

## 2020-11-29 RX ADMIN — INSULIN LISPRO SCH: 100 INJECTION, SOLUTION INTRAVENOUS; SUBCUTANEOUS at 00:30

## 2020-11-29 RX ADMIN — INSULIN LISPRO SCH: 100 INJECTION, SOLUTION INTRAVENOUS; SUBCUTANEOUS at 06:34

## 2020-11-29 RX ADMIN — INSULIN HUMAN SCH UNIT: 100 INJECTION, SUSPENSION SUBCUTANEOUS at 08:40

## 2020-11-29 NOTE — DISCHARGE SUMMARY
Providers





- Providers


Date of Admission: 


11/28/20 01:51





Date of discharge: 11/29/20


Attending physician: 


PAPO CANALES





                                        





11/28/20 03:02


Consult to Dietitian/Nutrition [CONS] Routine 


   Physician Instructions: 


   Reason For Exam: 


   Reason for Consult: Diet education











Primary care physician: 


PRIMARY CARE MD








Hospitalization


Condition: Critical


Hospital course: 


Subjective


Date of service: 11/29/20


Principal diagnosis: DKA


Interval history: 


45-year-old -American male with no significant past medical problems 

presenting to the emergency room today with complaints of been dehydrated.  He 

states he has been having dry mouth, polyuria, polydipsia, nausea and vomiting 

and generalized malaise.  He also indicates that he has been having 

unintentional weight loss.  He denies any fever or chills, no chest pain or 

shortness of breath, no headache or dizziness.


Patient works as a .  Denies any sick contacts and no recent 

travel.  Denies any contact with anyone with COVID-19.


Upon arrival in the emergency room today blood sugar was found to be over 1000. 

 He was found to be in DKA.


He admits that both of his parents diabetic.


Patient has been started on insulin drip and and IV fluid.





Day #2 11/28/2020


Patient symptomatically better


Some blurring of vision


Less nausea, no vomiting


Started on clear liquids





Day #3 11/29/2020


Patient feeling much better


Blood glucose levels in the mid 300s


Patient educated by me about diabetic ketoacidosis and management of insulin-

dependent diabetes


Patient was advised regular exercise and diet


Patient to be discharged on Novolin 70/30 30 units twice a day and Metformin 500

 twice a day and to follow-up with primary care in 1 week.














Assessment and Plan





- Patient Problems


(1) DKA (diabetic ketoacidoses)


Current Visit: Yes   Status: Acute   


Qualifiers: 


   Diabetes mellitus type: type 1 


Plan to address problem: 


Much  improved


Discontinue IV  Insulin


IV fluids


Started on Novolin 70/30 25 units bid


Educated on Diabetes Mellitus 


Diet exercise  advised


 








(2) Dehydration


Current Visit: Yes   Status: Acute   


Plan to address problem: 


IV fluids for now








(3) PAULINA (acute kidney injury)


Current Visit: Yes   Status: Acute   


Plan to address problem: 


IV fluids for now


Improved








(4) Hyponatremia


Current Visit: Yes   Status: Acute   


Plan to address problem: 


Improved








(5) Hyperkalemia


Current Visit: Yes   Status: Acute   


Plan to address problem: 


Improved








(6) DVT prophylaxis


Current Visit: Yes   Status: Acute   


Plan to address problem: 


Heparin and GI prophylaxis














Disposition: DC-01 TO HOME OR SELFCARE





- Discharge Diagnoses


(1) DKA (diabetic ketoacidoses)


Status: Acute   


Qualifiers: 


   Diabetes mellitus type: type 1 





(2) Dehydration


Status: Acute   





(3) PAULINA (acute kidney injury)


Status: Acute   





(4) Hyponatremia


Status: Acute   





(5) Hyperkalemia


Status: Acute   





(6) DVT prophylaxis


Status: Acute   





Core Measure Documentation





- Palliative Care


Palliative Care/ Comfort Measures: Not Applicable





- Core Measures


Any of the following diagnoses?: none





Exam





- Constitutional


Vitals: 


                                        











Temp Pulse Resp BP Pulse Ox


 


 98.6 F   87   17   128/61   99 


 


 11/29/20 07:39  11/29/20 10:00  11/29/20 10:00  11/29/20 09:03  11/29/20 10:00











General appearance: Present: no acute distress, well-nourished





- EENT


Eyes: Present: PERRL


ENT: hearing intact, clear oral mucosa





- Neck


Neck: Present: supple, normal ROM





- Respiratory


Respiratory effort: normal


Respiratory: bilateral: CTA





- Cardiovascular


Heart rate: 78


Rhythm: regular


Heart Sounds: Present: S1 & S2.  Absent: rub, click





- Extremities


Extremities: pulses symmetrical, No edema


Peripheral Pulses: within normal limits





- Abdominal


General gastrointestinal: Present: soft, non-tender, non-distended, normal bowel

 sounds


Male genitourinary: Present: normal





- Rectal


Rectal Exam: deferred





- Integumentary


Integumentary: Present: clear, warm, dry





- Musculoskeletal


Musculoskeletal: gait normal, strength equal bilaterally





- Psychiatric


Psychiatric: appropriate mood/affect, intact judgment & insight





- Neurologic


Neurologic: CNII-XII intact, moves all extremities





- Allied Health


Allied health notes reviewed: nursing, case management





Plan


Activity: no restrictions


Diet: diabetic


Follow up with: 


PRIMARY CARE,MD [Primary Care Provider] - 3-5 Days

## 2021-08-25 ENCOUNTER — HOSPITAL ENCOUNTER (EMERGENCY)
Dept: HOSPITAL 5 - ED | Age: 46
Discharge: HOME | End: 2021-08-25
Payer: SELF-PAY

## 2021-08-25 VITALS — DIASTOLIC BLOOD PRESSURE: 92 MMHG | SYSTOLIC BLOOD PRESSURE: 133 MMHG

## 2021-08-25 DIAGNOSIS — Z79.82: ICD-10-CM
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
## 2021-08-25 DIAGNOSIS — Z88.8: ICD-10-CM

## 2021-08-25 DIAGNOSIS — E11.9: ICD-10-CM

## 2021-08-25 DIAGNOSIS — S93.691A: Primary | ICD-10-CM
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
## 2021-08-25 DIAGNOSIS — Z79.899: ICD-10-CM
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
## 2021-08-25 DIAGNOSIS — F17.200: ICD-10-CM

## 2021-08-25 DIAGNOSIS — Z79.4: ICD-10-CM

## 2021-08-25 PROCEDURE — 99283 EMERGENCY DEPT VISIT LOW MDM: CPT

## 2021-08-25 NOTE — XRAY REPORT
Right foot radiograph, 3 views



HISTORY: Fall



COMPARISON: None



FINDINGS: No acute fracture or malalignment. There is mild first MTP and scattered IP osteoarthritis.
 Lisfranc interval is preserved. There is soft tissue swelling about the foot. No soft tissue gas or 
radiopaque foreign body.



IMPRESSION: Soft tissue swelling about the foot. No acute osseous findings.



Signer Name: Tigre Mccann MD 

Signed: 8/25/2021 10:57 AM

Workstation Name: [email or web address]

## 2021-08-25 NOTE — EMERGENCY DEPARTMENT REPORT
ED Lower Extremity HPI





- General


Chief Complaint: Extremity Injury, Lower


Stated Complaint: NEEDS A WORK RELEASE/RT FOOT INJURY


Time Seen by Provider: 08/25/21 10:25


Source: patient


Mode of arrival: Ambulatory


Limitations: No Limitations





- History of Present Illness


Initial Comments: 


45-year-old male presents to the ER today with complaints of right foot pain.  

Patient states that he injured his right foot 2 days ago.  Patient states that 

he was walking down the steps in his home and when he got to the bottom 2 steps 

his flip-flop slipped out from his feet causing him to twist his foot.  He 

reports pain mainly to the lateral aspect of the foot.  He states that he stayed

home from work yesterday, and iced it and stretched it and he states that the 

pain has improved.  He states that when he try to go back to work today, they 

would not approve him to come back until he had it checked out and got clearance

to go back to work.  He denies any apparent bruising or swelling.  He denies any

prior injury, surgery to his foot.  He reports no other symptoms at this time.





MD Complaint: foot injury


-: days(s) (2)





- Related Data


                                  Previous Rx's











 Medication  Instructions  Recorded  Last Taken  Type


 


Ciprofloxacin HCl [Ciprofloxacin 500 mg PO Q12HR #20 tab 05/16/17 Unknown Rx





TAB]    


 


Ibuprofen [Motrin 800 MG tab] 800 mg PO Q8HR PRN #30 tablet 07/12/18 Unknown Rx


 


Amlodipine Besylate [Norvasc] 5 mg PO DAILY #30 tablet 03/19/20 Unknown Rx


 


Aspirin [Aspirin BABY CHEW TAB] 81 mg PO QDAY #100 tab.chew 11/29/20 Unknown Rx


 


Insulin NPH Hum/Reg Insulin Hm 30 unit SQ BID #2 vial 11/29/20 Unknown Rx





[Novolin 70-30 100 Unit/ml Vial]    


 


Valsartan [Diovan] 160 mg PO Q12HR #60 tablet 11/29/20 Unknown Rx


 


carvediloL [Coreg] 12.5 mg FEEDTUBE Q12HR 30 Days #60 11/29/20 Unknown Rx





 tablet   


 


metFORMIN [Glucophage] 500 mg PO BID #60 tablet 11/29/20 Unknown Rx











                                    Allergies











Allergy/AdvReac Type Severity Reaction Status Date / Time


 


promethazine HCl Allergy  Swelling Verified 08/25/21 09:13





[From Phenergan]     














ED Review of Systems


ROS: 


Stated complaint: NEEDS A WORK RELEASE/RT FOOT INJURY


Other details as noted in HPI





Comment: All other systems reviewed and negative


Constitutional: denies: chills, fever


Eyes: denies: eye pain, eye discharge, vision change


ENT: denies: ear pain, throat pain, dental pain, hearing loss, epistaxis, 

congestion


Musculoskeletal: joint swelling, arthralgia


Skin: denies: rash, lesions, change in color, change in hair/nails, pruritus


Neurological: denies: headache, weakness, paresthesias, confusion, abnormal 

gait, vertigo


Psychiatric: denies: anxiety, depression, auditory hallucinations, visual 

hallucinations, homicidal thoughts, suicidal thoughts


Hematological/Lymphatic: denies: easy bleeding, easy bruising, swollen glands





ED Past Medical Hx





- Past Medical History


Previous Medical History?: Yes


Hx Diabetes: Yes


Additional medical history: Bronchitis





- Surgical History


Past Surgical History?: No





- Social History


Smoking Status: Current Every Day Smoker


Substance Use Type: None





- Medications


Home Medications: 


                                Home Medications











 Medication  Instructions  Recorded  Confirmed  Last Taken  Type


 


Ciprofloxacin HCl [Ciprofloxacin 500 mg PO Q12HR #20 tab 05/16/17 11/28/20 

Unknown Rx





TAB]     


 


Ibuprofen [Motrin 800 MG tab] 800 mg PO Q8HR PRN #30 tablet 07/12/18 11/28/20 

Unknown Rx


 


Amlodipine Besylate [Norvasc] 5 mg PO DAILY #30 tablet 03/19/20 11/28/20 Unknown

 Rx


 


Aspirin [Aspirin BABY CHEW TAB] 81 mg PO QDAY #100 tab.chew 11/29/20  Unknown Rx


 


Insulin NPH Hum/Reg Insulin Hm 30 unit SQ BID #2 vial 11/29/20  Unknown Rx





[Novolin 70-30 100 Unit/ml Vial]     


 


Valsartan [Diovan] 160 mg PO Q12HR #60 tablet 11/29/20  Unknown Rx


 


carvediloL [Coreg] 12.5 mg FEEDTUBE Q12HR 30 Days #60 11/29/20  Unknown Rx





 tablet    


 


metFORMIN [Glucophage] 500 mg PO BID #60 tablet 11/29/20  Unknown Rx














ED Physical Exam





- General


Limitations: No Limitations


General appearance: alert, in no apparent distress





- Head


Head exam: Present: atraumatic, normocephalic, normal inspection





- Eye


Eye exam: Present: normal appearance, PERRL, EOMI


Pupils: Present: normal accommodation





- Neck


Neck exam: Present: normal inspection, full ROM





- Respiratory


Respiratory exam: Present: normal lung sounds bilaterally.  Absent: respiratory 

distress, wheezes, rales, rhonchi





- Cardiovascular


Cardiovascular Exam: Present: regular rate, normal rhythm, normal heart sounds





- Expanded Lower Extremity Exam


  ** Right


Foot/Toe exam: Present: normal inspection, full ROM, tenderness (Mild ttp 

lateral aspect of right foot including base of 5th metatarsal), tenderness at 

base of 5th metatarsal.  Absent: swelling, abrasion, laceration, ecchymosis, 

deformity, crepidus, dislocation, erythema, amputation, puncture wound, foreign 

body, calcaneal tenderness, nail avulsion, subungual hematoma


Neuro vascular tendon exam: Present: no vascular compromise.  Absent: pulse 

deficit, abnormal cap refill, motor deficit, sensory deficit, tendon deficit


Gait: Positive: observed and normal





- Neurological Exam


Neurological exam: Present: alert, oriented X3, CN II-XII intact, normal gait





- Psychiatric


Psychiatric exam: Present: normal affect, normal mood





ED Course


                                   Vital Signs











  08/25/21





  09:15


 


Temperature 98.8 F


 


Pulse Rate 80


 


Respiratory 18





Rate 


 


Blood Pressure 164/84


 


O2 Sat by Pulse 100





Oximetry 











Critical care attestation.: 


If time is entered above; I have spent that time in minutes in the direct care 

of this critically ill patient, excluding procedure time.








ED Disposition


Clinical Impression: 


 Foot sprain





Disposition: 01 HOME / SELF CARE / HOMELESS


Is pt being admited?: No


Does the pt Need Aspirin: No


Condition: Stable


Instructions:  Foot Sprain


Additional Instructions: 


I recommend taking ibuprofen (motrin) every 6-8 hours or tylenol (acetaminophen)

 4 to 6 hours as needed for pain.  You should be able to return to work today, 

just recommend that you elevate your leg during your breaks.  You can apply ice 

to help with any swelling or pain.  Follow-up with orthopedic specialist listed 

on your discharge instructions in 1 to 2 weeks if your symptoms persist.  Return

 to the ER if your symptoms changes or worsens in any way.


Referrals: 


SAV RODRIGUEZ MD [Staff Physician] - 7-10 days (Orthopedic specialist)


Time of Disposition: 11:14